# Patient Record
Sex: MALE | Race: WHITE | Employment: FULL TIME | URBAN - METROPOLITAN AREA
[De-identification: names, ages, dates, MRNs, and addresses within clinical notes are randomized per-mention and may not be internally consistent; named-entity substitution may affect disease eponyms.]

---

## 2017-10-31 ENCOUNTER — OFFICE VISIT (OUTPATIENT)
Dept: ORTHOPEDIC SURGERY | Age: 22
End: 2017-10-31

## 2017-10-31 VITALS
RESPIRATION RATE: 16 BRPM | BODY MASS INDEX: 39.2 KG/M2 | HEIGHT: 71 IN | HEART RATE: 89 BPM | SYSTOLIC BLOOD PRESSURE: 136 MMHG | WEIGHT: 280 LBS | DIASTOLIC BLOOD PRESSURE: 74 MMHG

## 2017-10-31 DIAGNOSIS — S92.355A CLOSED NONDISPLACED FRACTURE OF FIFTH METATARSAL BONE OF LEFT FOOT, INITIAL ENCOUNTER: Primary | ICD-10-CM

## 2017-10-31 PROCEDURE — 28470 CLTX METATARSAL FX WO MNP EA: CPT | Performed by: ORTHOPAEDIC SURGERY

## 2017-10-31 PROCEDURE — 99203 OFFICE O/P NEW LOW 30 MIN: CPT | Performed by: ORTHOPAEDIC SURGERY

## 2017-10-31 PROCEDURE — L4361 PNEUMA/VAC WALK BOOT PRE OTS: HCPCS | Performed by: ORTHOPAEDIC SURGERY

## 2017-10-31 PROCEDURE — 73630 X-RAY EXAM OF FOOT: CPT | Performed by: ORTHOPAEDIC SURGERY

## 2017-11-05 PROBLEM — S92.355A CLOSED NONDISPLACED FRACTURE OF FIFTH LEFT METATARSAL BONE: Status: ACTIVE | Noted: 2017-11-05

## 2017-11-05 NOTE — PROGRESS NOTES
CHIEF COMPLAINT: Left foot pain/ 5th MT proximal shaft minimally displaced fracture. DATE OF INJURY:  10/28/2017, DOT 10/31/2017    HISTORY:  Mr. Angel Ventura 23 y.o.   male  presents today for the first visit for evaluation of a left foot injury which occurred when he was playing football. He is complaining of lateral foot pain and swelling. Rates pain a 6/10 VAS. This is better with elevation and worse with bearing any wt. The pain is sharp and not radiating. No other complaint. He has h/o left foot fracture 5 years ago. History reviewed. No pertinent past medical history. History reviewed. No pertinent surgical history. Social History     Social History    Marital status: Single     Spouse name: N/A    Number of children: N/A    Years of education: N/A     Occupational History    Not on file. Social History Main Topics    Smoking status: Never Smoker    Smokeless tobacco: Never Used    Alcohol use No    Drug use: No    Sexual activity: Not on file     Other Topics Concern    Not on file     Social History Narrative    No narrative on file       History reviewed. No pertinent family history. No current outpatient prescriptions on file prior to visit. No current facility-administered medications on file prior to visit. Pertinent items are noted in HPI  Review of systems reviewed from Patient History Form dated on 10/31/2017 and available in the patient's chart under the Media tab. PHYSICAL EXAMINATION:  Mr. Angel Ventura is a very pleasant 23 y.o.  male who presents today in no acute distress, awake, alert, and oriented. He is well dressed, nourished and  groomed. Patient with normal affect. Height is  5' 11\" (1.803 m), weight is 280 lb (127 kg), Body mass index is 39.05 kg/m². Resting respiratory rate is 16. Examination of the gait, showed that the patient walks with a limp.   Examination of both ankles showing a decreased range of motion of the

## 2017-12-06 ENCOUNTER — OFFICE VISIT (OUTPATIENT)
Dept: ORTHOPEDIC SURGERY | Age: 22
End: 2017-12-06

## 2017-12-06 VITALS — BODY MASS INDEX: 36.96 KG/M2 | RESPIRATION RATE: 18 BRPM | HEIGHT: 71 IN | WEIGHT: 264 LBS

## 2017-12-06 DIAGNOSIS — S92.355A CLOSED NONDISPLACED FRACTURE OF FIFTH METATARSAL BONE OF LEFT FOOT, INITIAL ENCOUNTER: Primary | ICD-10-CM

## 2017-12-06 PROCEDURE — 99024 POSTOP FOLLOW-UP VISIT: CPT | Performed by: ORTHOPAEDIC SURGERY

## 2017-12-06 NOTE — PROGRESS NOTES
ankle compare to the other side. There is minimal swelling that can be seen, no ecchymosis over lateral side of the left foot. He  has intact sensation and good pedal pulses. He has no tenderness on deep palpation over the 5th MT base left foot        IMAGING: Xray's were reviewed, taken today in the office, 3 views of the left foot, and showed a minimally displaced 5th MT proximal base fracture, possible stress fracture. IMPRESSION: Left 5th MT proximal shaft minimally displaced fracture. PLAN: I discussed that the overall alignment of this fracture is good and healing well. We discussed the risk of nonunion and  malunion. He will be WBAT in the boot, and start aggressive ROM and peroneal strengthening exercise. Off the boot in 1 week. The patient will come back for a follow up in 2 months. At that time, we will take 3 views of the left foot standing.        Elsi Fletcher MD

## 2018-02-15 ENCOUNTER — TELEPHONE (OUTPATIENT)
Dept: ORTHOPEDIC SURGERY | Age: 23
End: 2018-02-15

## 2018-02-15 ENCOUNTER — OFFICE VISIT (OUTPATIENT)
Dept: ORTHOPEDIC SURGERY | Age: 23
End: 2018-02-15

## 2018-02-15 VITALS
DIASTOLIC BLOOD PRESSURE: 86 MMHG | HEART RATE: 78 BPM | SYSTOLIC BLOOD PRESSURE: 130 MMHG | WEIGHT: 283 LBS | HEIGHT: 71 IN | BODY MASS INDEX: 39.62 KG/M2

## 2018-02-15 DIAGNOSIS — M25.511 ACUTE PAIN OF RIGHT SHOULDER: Primary | ICD-10-CM

## 2018-02-15 PROCEDURE — 99214 OFFICE O/P EST MOD 30 MIN: CPT | Performed by: ORTHOPAEDIC SURGERY

## 2018-02-15 NOTE — PROGRESS NOTES
General:   alert, appears stated age, cooperative and no distress   Right Shoulder   Active ROM:   forward flexion 180, external rotation 80, internal rotation L4. Bilateral shoulders   Joint Tenderness:   posterior acromial   Neer:   positive   Serrano:   negative   Strength:   5/5 Supraspinatus, External rotation, Internal rotation    Bilateral shoulders   Drop-arm test:   negative   Belly-press test:   negative   Bear-hug test:   negative   Speed's test:   negative   Bicipital groove tenderness:  negative   Telles's test:   positive   Cross-body adduction test:   negative    AC joint tenderness:   negative   Negative jerk test.    There are no skin lesions, cellulitis, or extreme edema in the upper extremities. Sensation is grossly intact to light touch bilaterally upper extremity. The patient has warm and well-perfused Bilateral upper extremities with brisk capillary refill. Imaging   Right Shoulder X-Ray: 3 view x-rays of the shoulder including AP, scapular Y, and axillary obtained and reviewed  AC Joint: no abnormalities noted  Glenohumeral joint: no abnormalities noted  Elevation humeral head: absent     Right Shoulder MRI: ordered, but not yet obtained      Assessment:      Right shoulder pain, possible labral tear      Plan:      Natural history and expected course discussed. Questions answered. MR arthrogram of right shoulder given 6 month history of pain after injury during football, and he has already been doing RTC and periscapular strengthening in training room. ATC informed of plan. Patient is agreeable with me informing ATC of MRI results to expedite treatment. Will call or discuss plan with ATC after MRI.

## 2018-02-16 ENCOUNTER — TELEPHONE (OUTPATIENT)
Dept: ORTHOPEDIC SURGERY | Age: 23
End: 2018-02-16

## 2018-02-16 ENCOUNTER — HOSPITAL ENCOUNTER (OUTPATIENT)
Dept: GENERAL RADIOLOGY | Age: 23
Discharge: HOME | End: 2018-02-16
Attending: ORTHOPAEDIC SURGERY | Admitting: ORTHOPAEDIC SURGERY

## 2018-02-16 DIAGNOSIS — M25.511 ACUTE PAIN OF RIGHT SHOULDER: ICD-10-CM

## 2018-02-16 DIAGNOSIS — G89.11 ACUTE PAIN OF RIGHT SHOULDER DUE TO TRAUMA: ICD-10-CM

## 2018-02-16 DIAGNOSIS — M25.511 RIGHT SHOULDER PAIN, UNSPECIFIED CHRONICITY: Primary | ICD-10-CM

## 2018-02-16 DIAGNOSIS — M25.511 PAIN IN RIGHT SHOULDER: ICD-10-CM

## 2018-02-16 DIAGNOSIS — M25.511 ACUTE PAIN OF RIGHT SHOULDER DUE TO TRAUMA: ICD-10-CM

## 2018-02-20 ENCOUNTER — OFFICE VISIT (OUTPATIENT)
Dept: ORTHOPEDIC SURGERY | Age: 23
End: 2018-02-20

## 2018-02-20 VITALS
WEIGHT: 283.07 LBS | BODY MASS INDEX: 39.63 KG/M2 | SYSTOLIC BLOOD PRESSURE: 119 MMHG | HEART RATE: 77 BPM | HEIGHT: 71 IN | DIASTOLIC BLOOD PRESSURE: 82 MMHG | RESPIRATION RATE: 17 BRPM

## 2018-02-20 DIAGNOSIS — S43.431A TEAR OF RIGHT GLENOID LABRUM, INITIAL ENCOUNTER: Primary | ICD-10-CM

## 2018-02-20 PROCEDURE — 99214 OFFICE O/P EST MOD 30 MIN: CPT | Performed by: ORTHOPAEDIC SURGERY

## 2018-02-20 NOTE — PROGRESS NOTES
CHIEF COMPLAINT: Right shoulder pain    DATE OF INJURY: August 2017    History:    Hilary Lin is a 23 y.o. 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 Initial history:  referred by Reji Langford ATC for evaluation and treatment of Right shoulder pain. This is evaluated as a personal injury. The pain is described as aching. The pain began 6 months ago. There was an injury. He felt a pop in th shoulder while blocking another player. Pain is rated as a 4-7/10 . Pain is located deep in shoulder, posterior. The symptoms are worse with bench press, lifting, laying on side. Symptoms improve with ice, Game[employer], NSAIDs. Limited activities include no limitations. No stiffness, no weakness reported. The patient does not report night pain. The patient has not had PT. The patient has not had an injection. The patient has tried NSAIDs. He is an offensive . Interval History:  Shoulder feels better. Rates pain 0/10. Physical Examination:      Vital signs:  /82   Pulse 77   Resp 17   Ht 5' 10.87\" (1.8 m)   Wt 283 lb 1.1 oz (128.4 kg)   BMI 39.63 kg/m²    General:   alert, appears stated age, cooperative and no distress   Right Shoulder   Active ROM:   forward flexion 180, external rotation 80, internal rotation L4. Bilateral shoulders   Joint Tenderness:   posterior acromial   Neer:   positive   Serrano:   negative   Strength:   5/5 Supraspinatus, External rotation, Internal rotation    Bilateral shoulders   Drop-arm test:   negative   Belly-press test:   negative   Bear-hug test:   negative   Speed's test:   negative   Bicipital groove tenderness:  negative   Telles's test:   positive   Cross-body adduction test:   negative    AC joint tenderness:   negative   Negative jerk test.    There are no skin lesions, cellulitis, or extreme edema in the upper extremities.  Sensation is grossly intact to light touch bilaterally upper extremity. The patient has warm and well-perfused Bilateral upper extremities with brisk capillary refill. Imaging   Right Shoulder MRI: I independently reviewed the images, as well as the radiology report. Nondisplaced tear of the posterosuperior, posterior, and posterior inferior   labrum, most compatible with a SLAP 8 type tear.  No discrete rotator cuff   tear.       Os acromiale variant.           Assessment:      Right shoulder posterior labral tear      Plan:      I had an extensive discussion with Mr. Toyin Lopez and/or family regarding the natural history, etiology, and long term consequences of his condition. I have outlined a treatment plan with them and, in my opinion, surgical intervention is indicated at this time. I have discussed the potential complications (including, but not limited to injury to nerve or blood vessel, infection, bleeding, DVT or PE, stiffness, incomplete pain relief, need for further surgery, and anesthetic complications), limitations, expectations, alternatives, and risks of the surgical procedure. We also discussed the importance of postoperative rehabilitation. He has had full opportunity to ask his questions. I have answered them all to his satisfaction. I feel that Mr. Toyin Lopez understands our discussion today and he will provide written informed consent for the procedure.       Plan for right shoulder arthroscopy, posterior labral repair    I will perform H&P.

## 2018-02-22 ENCOUNTER — PAT TELEPHONE (OUTPATIENT)
Dept: PREADMISSION TESTING | Age: 23
End: 2018-02-22

## 2018-02-22 VITALS — WEIGHT: 274 LBS | BODY MASS INDEX: 38.36 KG/M2 | HEIGHT: 71 IN

## 2018-02-22 NOTE — PRE-PROCEDURE INSTRUCTIONS
[address] [facility] time____0715________        Surgery time__0910__________    Take the following medications with a sip of water:  No medications    Do not eat or drink anything after 12:00 midnight prior to your surgery. This includes water chewing gum, mints and ice chips. You may brush your teeth and gargle the morning of your surgery, but do not swallow the water     Please see your family doctor/pediatrician for a history and physical and/or concerning medications. Bring any test results/reports from your physicians office. If you are under the care of a heart doctor or specialist doctor, please be aware that you may be asked to them for clearance    You may be asked to stop blood thinners such as Coumadin, Plavix, Fragmin, Lovenox, etc., or any anti-inflammatories such as:  Aspirin, Ibuprofen, Advil, Naproxen prior to your surgery. We also ask that you stop any OTC medications such as fish oil, vitamin E, glucosamine, garlic, Multivitamins, COQ 10, etc.    We ask that you do not smoke 24 hours prior to surgery  We ask that you do not  drink any alcoholic beverages 24 hours prior to surgery     You must make arrangements for a responsible adult to take you home after your surgery. For your safety you will not be allowed to leave alone or drive yourself home. Your surgery will be cancelled if you do not have a ride home. Also for your safety, it is strongly suggested that someone stay with you the first 24 hours after your surgery. A parent or legal guardian must accompany a child scheduled for surgery and plan to stay at the hospital until the child is discharged. Please do not bring other children with you. For your comfort, please wear simple loose fitting clothing to the hospital.  Please do not bring valuables.     Do not wear any make-up or nail polish on your fingers or toes      For your safety, please do not wear any jewelry or body

## 2018-03-02 ENCOUNTER — HOSPITAL ENCOUNTER (OUTPATIENT)
Dept: SURGERY | Age: 23
Discharge: HOME | End: 2018-03-02
Attending: ORTHOPAEDIC SURGERY | Admitting: ORTHOPAEDIC SURGERY

## 2018-03-02 VITALS
DIASTOLIC BLOOD PRESSURE: 92 MMHG | OXYGEN SATURATION: 97 % | TEMPERATURE: 97.3 F | HEART RATE: 72 BPM | SYSTOLIC BLOOD PRESSURE: 140 MMHG | RESPIRATION RATE: 16 BRPM

## 2018-03-02 DIAGNOSIS — S43.431A TEAR OF RIGHT GLENOID LABRUM, INITIAL ENCOUNTER: Primary | ICD-10-CM

## 2018-03-02 PROCEDURE — 29807 SHO ARTHRS SRG RPR SLAP LES: CPT | Performed by: ORTHOPAEDIC SURGERY

## 2018-03-02 RX ORDER — PROMETHAZINE HYDROCHLORIDE 25 MG/1
25 TABLET ORAL EVERY 6 HOURS PRN
Qty: 5 TABLET | Refills: 0 | Status: SHIPPED | OUTPATIENT
Start: 2018-03-02 | End: 2018-03-20

## 2018-03-02 RX ORDER — SODIUM CHLORIDE 9 MG/ML
INJECTION, SOLUTION INTRAVENOUS CONTINUOUS
Status: DISCONTINUED | OUTPATIENT
Start: 2018-03-02 | End: 2018-03-03 | Stop reason: HOSPADM

## 2018-03-02 RX ORDER — SODIUM CHLORIDE 0.9 % (FLUSH) 0.9 %
10 SYRINGE (ML) INJECTION EVERY 12 HOURS SCHEDULED
Status: DISCONTINUED | OUTPATIENT
Start: 2018-03-02 | End: 2018-03-03 | Stop reason: HOSPADM

## 2018-03-02 RX ORDER — PROMETHAZINE HYDROCHLORIDE 25 MG/ML
6.25 INJECTION, SOLUTION INTRAMUSCULAR; INTRAVENOUS
Status: ACTIVE | OUTPATIENT
Start: 2018-03-02 | End: 2018-03-02

## 2018-03-02 RX ORDER — ONDANSETRON 2 MG/ML
4 INJECTION INTRAMUSCULAR; INTRAVENOUS
Status: ACTIVE | OUTPATIENT
Start: 2018-03-02 | End: 2018-03-02

## 2018-03-02 RX ORDER — FENTANYL CITRATE 50 UG/ML
25 INJECTION, SOLUTION INTRAMUSCULAR; INTRAVENOUS EVERY 5 MIN PRN
Status: DISCONTINUED | OUTPATIENT
Start: 2018-03-02 | End: 2018-03-03 | Stop reason: HOSPADM

## 2018-03-02 RX ORDER — OXYCODONE HYDROCHLORIDE AND ACETAMINOPHEN 5; 325 MG/1; MG/1
1-2 TABLET ORAL EVERY 4 HOURS PRN
Qty: 60 TABLET | Refills: 0 | Status: SHIPPED | OUTPATIENT
Start: 2018-03-02 | End: 2018-03-16

## 2018-03-02 RX ORDER — SODIUM CHLORIDE 0.9 % (FLUSH) 0.9 %
10 SYRINGE (ML) INJECTION PRN
Status: DISCONTINUED | OUTPATIENT
Start: 2018-03-02 | End: 2018-03-03 | Stop reason: HOSPADM

## 2018-03-02 RX ADMIN — SODIUM CHLORIDE: 9 INJECTION, SOLUTION INTRAVENOUS at 07:24

## 2018-03-02 ASSESSMENT — PAIN DESCRIPTION - PAIN TYPE
TYPE: ACUTE PAIN
TYPE: SURGICAL PAIN

## 2018-03-02 ASSESSMENT — PAIN SCALES - GENERAL
PAINLEVEL_OUTOF10: 0
PAINLEVEL_OUTOF10: 4

## 2018-03-02 ASSESSMENT — PAIN - FUNCTIONAL ASSESSMENT: PAIN_FUNCTIONAL_ASSESSMENT: 0-10

## 2018-03-02 ASSESSMENT — PAIN DESCRIPTION - LOCATION: LOCATION: HEAD

## 2018-03-02 ASSESSMENT — PAIN DESCRIPTION - DESCRIPTORS: DESCRIPTORS: HEADACHE

## 2018-03-02 NOTE — ANESTHESIA PRE-OP
[facility] Department of Anesthesiology  Pre-Anesthesia Evaluation/Consultation       Name:  Sonja Stuart  : 1995  Age:  23 y.o. MRN:  [id number]  Date: 3/2/2018           Procedure (Scheduled):  R shoulder arthroscopy  Surgeon:  Dr. Silveira Clock     No Known Allergies  Patient Active Problem List   Diagnosis    Closed nondisplaced fracture of fifth left metatarsal bone    Tear of right glenoid labrum     Past Medical History:   Diagnosis Date    Wears glasses     reading     Past Surgical History:   Procedure Laterality Date    WISDOM TOOTH EXTRACTION       Social History   Substance Use Topics    Smoking status: Never Smoker    Smokeless tobacco: Never Used    Alcohol use No     Medications  Current Outpatient Prescriptions on File Prior to Encounter   Medication Sig Dispense Refill    Beta Carotene ([employer] B-CAROTENE PO) Take by mouth       No current facility-administered medications on file prior to encounter.       Current Outpatient Prescriptions   Medication Sig Dispense Refill    Beta Carotene ([employer] B-CAROTENE PO) Take by mouth       Current Facility-Administered Medications   Medication Dose Route Frequency Provider Last Rate Last Dose    0.9 % sodium chloride infusion   Intravenous Continuous Regina Obrien  mL/hr at 18 07      sodium chloride flush 0.9 % injection 10 mL  10 mL Intravenous 2 times per day Regina Obrien MD        sodium chloride flush 0.9 % injection 10 mL  10 mL Intravenous PRN Regina Obrien MD        ceFAZolin (ANCEF) 3 g in dextrose 5 % 100 mL IVPB  3 g Intravenous Once Flaca Lemus MD         Vital Signs (Current)   Vitals:    18   BP: (!) 148/91   Pulse: 77   Resp: 18   Temp: 98.2 °F (36.8 °C)   SpO2: 98%     Vital Signs Statistics (for past 48 hrs)     Temp  Av.2 °F (36.8 °C)  Min: 98.2 °F (36.8 °C)   Min taken time: 18  Max: 98.2 °F (36.8 °C)   Max taken time: 18  Pulse Av  Min: 68   Min taken time: 18  Max: 68   Max taken time: 18  Resp  Av  Min: 25   Min taken time: 18  Max: 25   Max taken time: 18  BP  Min: 148/91   Min taken time: 18  Max: 148/91   Max taken time: 18  SpO2  Av %  Min: 98 %   Min taken time: 18  Max: 98 %   Max taken time: 18    BP Readings from Last 3 Encounters:   18 (!) 148/91   18 119/82   02/15/18 130/86     BMI  There is no height or weight on file to calculate BMI. Estimated body mass index is 38.22 kg/m² as calculated from the following:    Height as of 18: 5' 11\" (1.803 m). Weight as of 18: 274 lb (124.3 kg). CBC No results found for: WBC, RBC, HGB, HCT, MCV, RDW, PLT  CMP  No results found for: NA, K, CL, CO2, BUN, CREATININE, GFRAA, AGRATIO, LABGLOM, GLUCOSE, PROT, CALCIUM, BILITOT, ALKPHOS, AST, ALT  BMP  No results found for: NA, K, CL, CO2, BUN, CREATININE, CALCIUM, GFRAA, LABGLOM, GLUCOSE  POCGlucose  No results for input(s): GLUCOSE in the last 72 hours.    Coags  No results found for: PROTIME, INR, APTT  HCG (If Applicable) No results found for: PREGTESTUR, PREGSERUM, HCG, HCGQUANT   ABGs No results found for: PHART, PO2ART, BQM5OBM, DAW1UNL, BEART, X3XBDROZ   Type & Screen (If Applicable)  No results found for: LABABO, LABRH                         BMI: Wt Readings from Last 3 Encounters:       NPO Status:   Date of last liquid consumption: 18   Time of last liquid consumption:    Date of last solid food consumption: 18              Anesthesia Evaluation  Patient summary reviewed no history of anesthetic complications:   Airway: Mallampati: I  TM distance: >3 FB   Neck ROM: full  Mouth opening: > = 3 FB Dental: normal exam         Pulmonary:Negative Pulmonary ROS and normal exam  breath sounds clear to auscultation                             Cardiovascular:Negative CV ROS  Exercise tolerance: good

## 2018-03-03 NOTE — OP NOTE
He received  prophylactic preoperative IV antibiotics. He had DVT prophylaxis with  sequential compression devices on his bilateral lower extremities. He was  then placed in the lateral decubitus position with his right side up. An  axillary roll was placed. Care was taken to ensure that all bony  prominences were well padded. His right arm was then placed in an  arthroscopic arm martell. His right shoulder was sterilely prepped and  draped in the usual fashion. A time-out was taken where the patient, the  operative extremity, and the operative procedure were once again verified. We then created our standard posterior arthroscopic portal.  The  arthroscope was inserted into glenohumeral joint. An anterior portal was  then created. There were no abnormalities in the anterior shoulder. The  subscapularis was intact. His labrum anteriorly and superiorly were all  completely intact. His biceps tendon was without any evidence of  tendinopathy or tendinitis. His articular cartilage was intact without any  evidence of chondromalacia within his glenoid or his humeral head. Viewing from the posterior portal, we could visualize some fraying of the  posterior labrum. We created an anterior-inferior portal as our passing  portal.  We then switched our scope to the anterior portal.  We then  created a larger posterior portal with the cannula. Viewing from the  anterior shoulder, we could visualize a displaced posterior labral tear,  starting at about the 11 o'clock position and extending down to about the 7  or 8 o'clock position posteriorly. With probing, this could be elevated  off the lip of the glenoid. We thus began by preparing the glenoid using a shaver as well as a rasp to  create a healing bone bed for our labral repair. We then passed the  Arthrex FiberLink suture through the labrum and then fed this through  itself to create a cinch stitch. This was then fed through a PushLock  anchor.   This

## 2018-03-05 ENCOUNTER — OFFICE VISIT (OUTPATIENT)
Dept: ORTHOPEDIC SURGERY | Age: 23
End: 2018-03-05

## 2018-03-05 VITALS — WEIGHT: 274.03 LBS | HEIGHT: 71 IN | BODY MASS INDEX: 38.36 KG/M2 | RESPIRATION RATE: 17 BRPM

## 2018-03-05 DIAGNOSIS — S43.431D TEAR OF RIGHT GLENOID LABRUM, SUBSEQUENT ENCOUNTER: Primary | ICD-10-CM

## 2018-03-05 PROCEDURE — 99024 POSTOP FOLLOW-UP VISIT: CPT | Performed by: ORTHOPAEDIC SURGERY

## 2018-03-14 ENCOUNTER — HOSPITAL ENCOUNTER (OUTPATIENT)
Dept: OTHER | Age: 23
Discharge: HOME | End: 2018-03-31
Attending: ORTHOPAEDIC SURGERY | Admitting: ORTHOPAEDIC SURGERY

## 2018-03-20 ENCOUNTER — OFFICE VISIT (OUTPATIENT)
Dept: ORTHOPEDIC SURGERY | Age: 23
End: 2018-03-20

## 2018-03-20 VITALS — HEIGHT: 71 IN | BODY MASS INDEX: 38.36 KG/M2 | RESPIRATION RATE: 16 BRPM | WEIGHT: 274 LBS

## 2018-03-20 DIAGNOSIS — S43.431D TEAR OF RIGHT GLENOID LABRUM, SUBSEQUENT ENCOUNTER: Primary | ICD-10-CM

## 2018-03-20 PROCEDURE — 99024 POSTOP FOLLOW-UP VISIT: CPT | Performed by: ORTHOPAEDIC SURGERY

## 2018-03-20 RX ORDER — ACETAMINOPHEN 325 MG/1
650 TABLET ORAL EVERY 6 HOURS PRN
COMMUNITY
End: 2018-10-30

## 2018-03-21 ENCOUNTER — HOSPITAL ENCOUNTER (OUTPATIENT)
Dept: PHYSICAL THERAPY | Age: 23
Discharge: HOME | End: 2018-03-22
Admitting: ORTHOPAEDIC SURGERY

## 2018-03-21 NOTE — FLOWSHEET NOTE
Table Slides  Flexion 10x10    UE Malo      Pulleys  Flexion 10x10       AROM Elbow  10x10    Isometrics      Retraction        3' black ball                   Biceps      Triceps            PRE's      Flexion      Abduction      External Rotation      Internal Rotation      Shrugs      EXT      Reverse Flys      Serratus      Horizontal Abd with ER      Biceps      Triceps      Retraction            Cable Column/Theraband      External Rotation green 3x10    Internal Rotation green 3x10    Shrugs 0# AROM 3x10    Lats      Ext green 3x10    Flex      Scapular Retraction green 3x10    BIC      TRIC      PNF            Dynamic Stability            Plyoback                Therapeutic Exercise and NMR EXR  [x] (07999) Provided verbal/tactile cueing for activities related to strengthening, flexibility, endurance, ROM  for improvements in scapular, scapulothoracic and UE control with self care, reaching, carrying, lifting, house/yardwork, driving/computer work.    [] (20580) Provided verbal/tactile cueing for activities related to improving balance, coordination, kinesthetic sense, posture, motor skill, proprioception  to assist with  scapular, scapulothoracic and UE control with self care, reaching, carrying, lifting, house/yardwork, driving/computer work. Therapeutic Activities:    [x] (03843 or 11321) Provided verbal/tactile cueing for activities related to improving balance, coordination, kinesthetic sense, posture, motor skill, proprioception and motor activation to allow for proper function of scapular, scapulothoracic and UE control with self care, carrying, lifting, driving/computer work.      Home Exercise Program:    [x] (92883) Reviewed/Progressed HEP activities related to strengthening, flexibility, endurance, ROM of scapular, scapulothoracic and UE control with self care, reaching, carrying, lifting, house/yardwork, driving/computer work  [] (08666) Reviewed/Progressed HEP activities related to improving balance, coordination, kinesthetic sense, posture, motor skill, proprioception of scapular, scapulothoracic and UE control with self care, reaching, carrying, lifting, house/yardwork, driving/computer work      Manual Treatments:  PROM / STM / Oscillations-Mobs:  G-I, II, III, IV (PA's, Inf., Post.)  [] (29326) Provided manual therapy to mobilize soft tissue/joints of cervical/CT, scapular GHJ and UE for the purpose of modulating pain, promoting relaxation,  increasing ROM, reducing/eliminating soft tissue swelling/inflammation/restriction, improving soft tissue extensibility and allowing for proper ROM for normal function with self care, reaching, carrying, lifting, house/yardwork, driving/computer work    Modalities:     Charges:  Timed Code Treatment Minutes: 47   Total Treatment Minutes: 55     [] EVAL (LOW) 04409   [] EVAL (MOD) 29082   [] EVAL (HIGH) 65143   [] RE-EVAL   [x] QU(05316) x  2   [] IONTO  [] NMR (31869) x      [] VASO  [] Manual (07392) x       [] Other:  [x] TA x  1    [] Mech Traction (69014)  [] ES(attended) (69742)      [] ES (un) (85748):       GOALS:  Patient stated goal: get back to football in the fall, be able to play golf this summer, be able to start lifting this summer     Therapist goals for Patient:   Short Term Goals: To be achieved in: 2 weeks  1. Independent in HEP and progression per patient tolerance, in order to prevent re-injury. 2. Patient will have a decrease in pain to facilitate improvement in movement, function, and ADLs as indicated by Functional Deficits.     Long Term Goals: To be achieved in: 6-8 weeks  1. Disability index score of 20% or less for the UEFI to assist with reaching prior level of function. 2. Patient will demonstrate increased AROM to 170+ flexion, 70 ER, 40 IR to allow for proper joint functioning as indicated by patients Functional Deficits.    3. Patient will demonstrate an increase in scapular stability strength as demonstrated by no scapular winging during WB activities  to allow for proper functional mobility as indicated by patients Functional Deficits. 4. Patient will return to UE weightlifting with team  without increased symptoms or restriction. 12+ weeks    Progression Towards Functional goals:  [x] Patient is progressing as expected towards functional goals listed. [] Progression is slowed due to complexities listed. [] Progression has been slowed due to co-morbidities. [] Plan just implemented, too soon to assess goals progression  [] Other:     ASSESSMENT:      Treatment/Activity Tolerance:  [x] Patient tolerated treatment well [] Patient limited by fatique  [] Patient limited by pain  [] Patient limited by other medical complications  [x] Other:  Pt w/ good tolerance to progression of protocol. No discomfort during forward flexion. Pt complained of minor discomfort during ER, primarily at end range. Emphasized importance of following protocol and continuing use of sling 24/7 until week 5; patient verbalized understanding. Pt shows independence w/ HEP, will continue exercises while on spring break next week, and return to PT in two weeks. Prognosis: [x] Good [] Fair  [] Poor    Patient Requires Follow-up: [x] Yes  [] No    PLAN: Progress per MD protocol  [x] Continue per plan of care [] Alter current plan (see comments)  [] Plan of care initiated [] Hold pending MD visit [] Discharge    Electronically signed by:  ARSEN Craven     Therapist was present, directed the patient's care, made skilled judgement, and was responsible for assessment and treatment of the patient.     Himanshu Julien, PT, DPT

## 2018-04-01 ENCOUNTER — HOSPITAL ENCOUNTER (OUTPATIENT)
Dept: OTHER | Age: 23
Discharge: HOME | End: 2018-04-30
Attending: ORTHOPAEDIC SURGERY | Admitting: ORTHOPAEDIC SURGERY

## 2018-04-04 ENCOUNTER — HOSPITAL ENCOUNTER (OUTPATIENT)
Dept: PHYSICAL THERAPY | Age: 23
Discharge: HOME | End: 2018-04-05
Admitting: ORTHOPAEDIC SURGERY

## 2018-04-04 NOTE — FLOWSHEET NOTE
(35242) Reviewed/Progressed HEP activities related to strengthening, flexibility, endurance, ROM of scapular, scapulothoracic and UE control with self care, reaching, carrying, lifting, house/yardwork, driving/computer work  [] (74084) Reviewed/Progressed HEP activities related to improving balance, coordination, kinesthetic sense, posture, motor skill, proprioception of scapular, scapulothoracic and UE control with self care, reaching, carrying, lifting, house/yardwork, driving/computer work      Manual Treatments:  PROM / STM / Oscillations-Mobs:  G-I, II, III, IV (PA's, Inf., Post.)  [] (09057) Provided manual therapy to mobilize soft tissue/joints of cervical/CT, scapular GHJ and UE for the purpose of modulating pain, promoting relaxation,  increasing ROM, reducing/eliminating soft tissue swelling/inflammation/restriction, improving soft tissue extensibility and allowing for proper ROM for normal function with self care, reaching, carrying, lifting, house/yardwork, driving/computer work    Modalities: Pt to use game ready in training room immediately after session    Charges:  Timed Code Treatment Minutes: 75   Total Treatment Minutes: 80     [] EVAL (LOW) 73251   [] EVAL (MOD) 90548   [] EVAL (HIGH) 49688   [] RE-EVAL   [x] EE(94556) x  2   [] IONTO  [x] NMR (27552) x  1   [] VASO  [] Manual (48804) x       [] Other:  [x] TA x  2    [] Mech Traction (10383)  [] ES(attended) (50624)      [] ES (un) (24720):       GOALS:  Patient stated goal: get back to football in the fall, be able to play golf this summer, be able to start lifting this summer     Therapist goals for Patient:   Short Term Goals: To be achieved in: 2 weeks  1. Independent in HEP and progression per patient tolerance, in order to prevent re-injury. 2. Patient will have a decrease in pain to facilitate improvement in movement, function, and ADLs as indicated by Functional Deficits.     Long Term Goals: To be achieved in: 6-8 weeks  1.  Disability index score of 20% or less for the UEFI to assist with reaching prior level of function. 2. Patient will demonstrate increased AROM to 170+ flexion, 70 ER, 40 IR to allow for proper joint functioning as indicated by patients Functional Deficits. 3. Patient will demonstrate an increase in scapular stability strength as demonstrated by no scapular winging during WB activities  to allow for proper functional mobility as indicated by patients Functional Deficits. 4. Patient will return to UE weightlifting with team  without increased symptoms or restriction. 12+ weeks    Progression Towards Functional goals:  [x] Patient is progressing as expected towards functional goals listed. [] Progression is slowed due to complexities listed. [] Progression has been slowed due to co-morbidities. [] Plan just implemented, too soon to assess goals progression  [] Other:      ASSESSMENT:      Treatment/Activity Tolerance:  [x] Patient tolerated treatment well [] Patient limited by fatique  [] Patient limited by pain  [] Patient limited by other medical complications  [x] Other:  Pt w/ good tolerance to progression of protocol introducing stretches and increased PREs. Required frequent cues to limit intensity, especially as he returns to weight room on his own. Reminded him of MD protocol and he verbalized understanding. Prognosis: [x] Good [] Fair  [] Poor    Patient Requires Follow-up: [x] Yes  [] No    PLAN: Progress per MD protocol  [x] Continue per plan of care [] Alter current plan (see comments)  [] Plan of care initiated [] Hold pending MD visit [] Discharge    Electronically signed by:  Devonna Bernheim, SPT     Therapist was present, directed the patient's care, made skilled judgement, and was responsible for assessment and treatment of the patient.     Rosalva Miner, PT, DPT

## 2018-04-11 ENCOUNTER — HOSPITAL ENCOUNTER (OUTPATIENT)
Dept: PHYSICAL THERAPY | Age: 23
Discharge: HOME | End: 2018-04-12
Admitting: ORTHOPAEDIC SURGERY

## 2018-04-11 NOTE — FLOWSHEET NOTE
10x10    Pulleys  Flexion; scaption; abduction 10x10    CBA table [id number]   Sleeper IR  10x10    Wall slides   10x10     Pec doorway stretch  10x10                          Isometrics      Retraction        3' black ball                   Biceps      Triceps            PRE's      Flexion      Abduction      External Rotation SL 2# 3x10    Internal Rotation      Shrugs      EXT      Reverse Flys      Serratus 4# 3x10     Horizontal Abd  Red  3x10     Biceps 8# 3x10 ECL 5 sec     Triceps      Retraction            Cable Column/Theraband      dc'd painful anterior shoulder   Internal Rotation blue 3x10    Shrugs 0# AROM 3x10    Lats      Ext black 3x10    Flex      Scapular Retraction black 3x10    BIC         PNF            Dynamic Stability            Plyoback                Therapeutic Exercise and NMR EXR  [x] (28277) Provided verbal/tactile cueing for activities related to strengthening, flexibility, endurance, ROM  for improvements in scapular, scapulothoracic and UE control with self care, reaching, carrying, lifting, house/yardwork, driving/computer work.    [] (49882) Provided verbal/tactile cueing for activities related to improving balance, coordination, kinesthetic sense, posture, motor skill, proprioception  to assist with  scapular, scapulothoracic and UE control with self care, reaching, carrying, lifting, house/yardwork, driving/computer work. Therapeutic Activities:    [x] (09467 or 81641) Provided verbal/tactile cueing for activities related to improving balance, coordination, kinesthetic sense, posture, motor skill, proprioception and motor activation to allow for proper function of scapular, scapulothoracic and UE control with self care, carrying, lifting, driving/computer work.      Home Exercise Program:    [x] (85808) Reviewed/Progressed HEP activities related to strengthening, flexibility, endurance, ROM of scapular, scapulothoracic and UE control with self care, reaching, carrying,

## 2018-04-18 ENCOUNTER — HOSPITAL ENCOUNTER (OUTPATIENT)
Dept: PHYSICAL THERAPY | Age: 23
Discharge: HOME | End: 2018-04-19
Admitting: ORTHOPAEDIC SURGERY

## 2018-04-18 NOTE — FLOWSHEET NOTE
[facility], [place]      Physical Therapy Daily Treatment Note  Date:  2018    Patient Name:  Ruthann Gandara    :  1995  MRN: [id number]  Medical/Treatment Diagnosis Information:  Diagnosis: C80.310I (ICD-10-CM) - Tear of right glenoid labrum, subsequent encounter  Treatment Diagnosis: Right shoulder pain N23.179  Insurance/Certification information:  PT Insurance Information: [employer]  Physician Information:  Referring Practitioner: Wilver Vazquez signed (Y/N): Y    Date of Patient follow up with Physician:      G-Code (if applicable):      Date G-Code Applied:    PT G-Codes  Functional Assessment Tool Used: UEFI  Score: 60%  Functional Limitation: Carrying, moving and handling objects  Carrying, Moving and Handling Objects Current Status (): At least 60 percent but less than 80 percent impaired, limited or restricted  Carrying, Moving and Handling Objects Goal Status (): At least 1 percent but less than 20 percent impaired, limited or restricted    Progress Note: [x]  Yes  []  No  Next due by: Visit #10      Latex Allergy:  [x]NO      []YES  Preferred Language for Healthcare:   [x]English       []other:    Visit # Insurance Allowable   5 MN     Pain level:  0/10    DOS: 2018     SUBJECTIVE:  Pt is 6 weeks s/p R shoulder arthroscopy. Pt notes he has no pain, he feels like he can reach overhead without the pain in front of shoulder this week. OBJECTIVE:  Observation:   Test measurements:      ROM PROM AROM  Comment    L R L R    Flexion        Abduction        ER        IR        Other        Other             Strength L R Comment   Flexion      Abduction      ER      IR      Supraspinatus      Upper Trap      Lower Trap      Mid Trap      Rhomboids      Biceps      Triceps      Horizontal Abduction      Horizontal Adduction      Lats              RESTRICTIONS/PRECAUTIONS: See MD protocol in media,     Weeks 6-12:  Full ROM, increased PRE    Weeks 12-24: Full strengthening, sport-specific, cleared for light/non-contact sports      Exercises:  Exercise/Equipment Resistance Repetitions Other comments   Stretching/ROM      Wand    ER 10x10 supine       BB IR rope  10x10    Pulleys  Flexion; scaption; abduction 10x10    CBA table [id number]   Sleeper IR  10x10    Wall slides   10x10     Pec doorway stretch  10x10                          Isometrics      Retraction                        Biceps      Triceps            PRE's      Flexion      Abduction      External Rotation SL 2# 3x10    Internal Rotation      Shrugs      EXT      Reverse Flys      Serratus 5# 3x10     Horizontal Abd  Red  3x10     Biceps 8# 3x10 ECL 5 sec     Triceps      Retraction      Prone Ys and Ts  3x10     Cable Column/Theraband      External Rotation black 3x10    Internal Rotation Black  3x10    Shrugs      Lats      Ext black 3x10    Flex      Scapular Retraction black 3x10    BIC         PNF blue 3x10 D2          Dynamic Stability            Plyoback                Therapeutic Exercise and NMR EXR  [x] (75018) Provided verbal/tactile cueing for activities related to strengthening, flexibility, endurance, ROM  for improvements in scapular, scapulothoracic and UE control with self care, reaching, carrying, lifting, house/yardwork, driving/computer work.    [] (58085) Provided verbal/tactile cueing for activities related to improving balance, coordination, kinesthetic sense, posture, motor skill, proprioception  to assist with  scapular, scapulothoracic and UE control with self care, reaching, carrying, lifting, house/yardwork, driving/computer work.     Therapeutic Activities:    [x] (67407 or 84053) Provided verbal/tactile cueing for activities related to improving balance, coordination, kinesthetic sense, posture, motor skill, proprioception and motor activation to allow for proper function of scapular, scapulothoracic and UE control with self care, carrying, lifting, driving/computer work.     Home Exercise Program:    [x] (96886) Reviewed/Progressed HEP activities related to strengthening, flexibility, endurance, ROM of scapular, scapulothoracic and UE control with self care, reaching, carrying, lifting, house/yardwork, driving/computer work  [] (06372) Reviewed/Progressed HEP activities related to improving balance, coordination, kinesthetic sense, posture, motor skill, proprioception of scapular, scapulothoracic and UE control with self care, reaching, carrying, lifting, house/yardwork, driving/computer work      Manual Treatments:  PROM / STM / Oscillations-Mobs:  G-I, II, III, IV (PA's, Inf., Post.)  [] ([id number]) Provided manual therapy to mobilize soft tissue/joints of cervical/CT, scapular GHJ and UE for the purpose of modulating pain, promoting relaxation,  increasing ROM, reducing/eliminating soft tissue swelling/inflammation/restriction, improving soft tissue extensibility and allowing for proper ROM for normal function with self care, reaching, carrying, lifting, house/yardwork, driving/computer work    Modalities:     Charges:  Timed Code Treatment Minutes: 58   Total Treatment Minutes: 65     [] EVAL (LOW) 17622   [] EVAL (MOD) 83570   [] EVAL (HIGH) 07298   [] RE-EVAL   [x] XY(54148) x  1   [] IONTO  [x] NMR (31762) x  1   [] VASO  [] Manual ([id number]) x       [] Other:  [x] TA x  2    [] [facility] Traction (11355)  [] ES(attended) (50371)      [] ES (un) (20089):       GOALS:  Patient stated goal: get back to football in the fall, be able to play golf this summer, be able to start lifting this summer     Therapist goals for Patient:   Short Term Goals: To be achieved in: 2 weeks  1. Independent in HEP and progression per patient tolerance, in order to prevent re-injury. 2. Patient will have a decrease in pain to facilitate improvement in movement, function, and ADLs as indicated by Functional Deficits.     Long Term Goals: To be achieved in: 6-8 weeks  1.  Disability index score of 20% or

## 2018-04-25 ENCOUNTER — HOSPITAL ENCOUNTER (OUTPATIENT)
Dept: PHYSICAL THERAPY | Age: 23
Discharge: HOME | End: 2018-04-26
Admitting: ORTHOPAEDIC SURGERY

## 2018-04-25 NOTE — PLAN OF CARE
[facility], [place]       Physical Therapy Discharge Summary- Annie Santana    Dear  Dr. Katheryn Rivera,    We had the pleasure of treating the following patient for physical therapy services at [address] [place] [address]. A summary of our findings can be found in the discharge summary below. If you have any questions or concerns regarding these findings, please do not hesitate to contact me at the office phone number above. Thank you for the referral.     Physician Signature:________________________________Date:__________________  By signing above (or electronic signature), therapists plan is approved by physician      Overall Response to Treatment:   []Patient is responding well to treatment and improvement is noted with regards  to goals   []Patient should continue to improve in reasonable time if they continue HEP   []Patient has plateaued and is no longer responding to skilled PT intervention    []Patient is getting worse and would benefit from return to referring MD   []Patient unable to adhere to initial POC   []Other: Pt shows excellent ROM but still has some strength deficits in all planes. He is finishing the school year and is returning home next week. He will continue with strengthening with  at home. He was given copy of current exercises and protocol to give to . At this time, pt is transferring care and will need MD clearance once he returns to school for return to sport. DC PT.      Date range of Visits: 3/14/18-18  Total Visits: 6   Physical Therapy Daily Treatment Note  Date:  2018    Patient Name:  Efrain Puga    :  1995  MRN: [id number]  Medical/Treatment Diagnosis Information:  Diagnosis: E49.126E (ICD-10-CM) - Tear of right glenoid labrum, subsequent encounter  Treatment Diagnosis: Right shoulder pain P24.880  Insurance/Certification information:  PT Insurance Information: [employer]et[employer]  Physician Information:  Referring Isometrics      Retraction                        Biceps      Triceps            PRE's      Flexion      Abduction      External Rotation SL 2# 3x10    Internal Rotation      Shrugs      EXT      Reverse Flys      Serratus 5# 3x10     Horizontal Abd  Black  3x10     Biceps 8# 3x10 ECL 5 sec     Triceps      Retraction      Prone Ys and Ts 2# 3x10     Cable Column/Theraband      External Rotation black 3x10    Internal Rotation Black  3x10    Shrugs      Lats      Ext black 3x10    Flex      Scapular Retraction black 3x10    BIC         PNF blue 3x10 D2          Dynamic Stability            Plyoback                Therapeutic Exercise and NMR EXR  [x] (40588) Provided verbal/tactile cueing for activities related to strengthening, flexibility, endurance, ROM  for improvements in scapular, scapulothoracic and UE control with self care, reaching, carrying, lifting, house/yardwork, driving/computer work.    [] (61383) Provided verbal/tactile cueing for activities related to improving balance, coordination, kinesthetic sense, posture, motor skill, proprioception  to assist with  scapular, scapulothoracic and UE control with self care, reaching, carrying, lifting, house/yardwork, driving/computer work. Therapeutic Activities:    [x] (64940 or 75083) Provided verbal/tactile cueing for activities related to improving balance, coordination, kinesthetic sense, posture, motor skill, proprioception and motor activation to allow for proper function of scapular, scapulothoracic and UE control with self care, carrying, lifting, driving/computer work.      Home Exercise Program:    [x] (60003) Reviewed/Progressed HEP activities related to strengthening, flexibility, endurance, ROM of scapular, scapulothoracic and UE control with self care, reaching, carrying, lifting, house/yardwork, driving/computer work  [] (53206) Reviewed/Progressed HEP activities related to improving balance, coordination, kinesthetic sense, for proper functional mobility as indicated by patients Functional Deficits. MET  4. Patient will return to UE weightlifting with team  without increased symptoms or restriction. 12+ weeks ONGOING    Progression Towards Functional goals:  [x] Patient is progressing as expected towards functional goals listed. [] Progression is slowed due to complexities listed. [] Progression has been slowed due to co-morbidities.   [] Plan just implemented, too soon to assess goals progression  [] Other:      ASSESSMENT:      Treatment/Activity Tolerance:  [x] Patient tolerated treatment well [] Patient limited by fatique  [] Patient limited by pain  [] Patient limited by other medical complications  [x] Other:     Prognosis: [x] Good [] Fair  [] Poor    Patient Requires Follow-up: [x] Yes  [] No    PLAN: Progress per MD protocol  [x] Continue per plan of care [] Alter current plan (see comments)  [] Plan of care initiated [] Hold pending MD visit [] Discharge    Electronically signed by:    London Garnica PT, DPT

## 2018-05-01 ENCOUNTER — HOSPITAL ENCOUNTER (OUTPATIENT)
Dept: OTHER | Age: 23
Discharge: HOME | End: 2018-05-31
Attending: ORTHOPAEDIC SURGERY | Admitting: ORTHOPAEDIC SURGERY

## 2018-10-03 ENCOUNTER — PROCEDURE VISIT (OUTPATIENT)
Dept: SPORTS MEDICINE | Age: 23
End: 2018-10-03

## 2018-10-03 DIAGNOSIS — S60.222A CONTUSION OF LEFT HAND, INITIAL ENCOUNTER: Primary | ICD-10-CM

## 2018-10-04 ENCOUNTER — OFFICE VISIT (OUTPATIENT)
Dept: ORTHOPEDIC SURGERY | Age: 23
End: 2018-10-04
Payer: COMMERCIAL

## 2018-10-04 VITALS
HEIGHT: 71 IN | HEART RATE: 75 BPM | DIASTOLIC BLOOD PRESSURE: 85 MMHG | BODY MASS INDEX: 38.36 KG/M2 | RESPIRATION RATE: 17 BRPM | SYSTOLIC BLOOD PRESSURE: 135 MMHG | WEIGHT: 274 LBS

## 2018-10-04 DIAGNOSIS — M79.642 LEFT HAND PAIN: Primary | ICD-10-CM

## 2018-10-04 PROCEDURE — 99214 OFFICE O/P EST MOD 30 MIN: CPT | Performed by: ORTHOPAEDIC SURGERY

## 2018-10-05 ASSESSMENT — PAIN SCALES - GENERAL: PAINLEVEL_OUTOF10: 5

## 2018-10-29 ENCOUNTER — PROCEDURE VISIT (OUTPATIENT)
Dept: SPORTS MEDICINE | Age: 23
End: 2018-10-29

## 2018-10-29 DIAGNOSIS — H92.02 ACUTE PAIN OF LEFT EAR: Primary | ICD-10-CM

## 2018-10-30 ENCOUNTER — OFFICE VISIT (OUTPATIENT)
Dept: FAMILY MEDICINE CLINIC | Age: 23
End: 2018-10-30
Payer: COMMERCIAL

## 2018-10-30 VITALS
RESPIRATION RATE: 20 BRPM | HEART RATE: 80 BPM | WEIGHT: 301 LBS | HEIGHT: 71 IN | TEMPERATURE: 98.4 F | BODY MASS INDEX: 42.14 KG/M2 | SYSTOLIC BLOOD PRESSURE: 120 MMHG | DIASTOLIC BLOOD PRESSURE: 78 MMHG

## 2018-10-30 DIAGNOSIS — S09.90XS: Primary | ICD-10-CM

## 2018-10-30 DIAGNOSIS — H91.92: Primary | ICD-10-CM

## 2018-10-30 PROCEDURE — 99204 OFFICE O/P NEW MOD 45 MIN: CPT | Performed by: REGISTERED NURSE

## 2018-10-30 ASSESSMENT — PATIENT HEALTH QUESTIONNAIRE - PHQ9
SUM OF ALL RESPONSES TO PHQ QUESTIONS 1-9: 0
SUM OF ALL RESPONSES TO PHQ QUESTIONS 1-9: 0
2. FEELING DOWN, DEPRESSED OR HOPELESS: 0
1. LITTLE INTEREST OR PLEASURE IN DOING THINGS: 0
SUM OF ALL RESPONSES TO PHQ9 QUESTIONS 1 & 2: 0

## 2018-11-02 ENCOUNTER — HOSPITAL ENCOUNTER (OUTPATIENT)
Dept: MRI IMAGING | Age: 23
Discharge: HOME | End: 2018-11-02
Payer: COMMERCIAL

## 2018-11-02 DIAGNOSIS — S09.90XS: ICD-10-CM

## 2018-11-02 DIAGNOSIS — H91.92: ICD-10-CM

## 2018-11-02 PROCEDURE — 70551 MRI BRAIN STEM W/O DYE: CPT

## 2018-11-02 ASSESSMENT — PAIN SCALES - GENERAL: PAINLEVEL_OUTOF10: 3

## 2018-11-20 ASSESSMENT — ENCOUNTER SYMPTOMS
SINUS PRESSURE: 0
WHEEZING: 0
SORE THROAT: 0
TROUBLE SWALLOWING: 0
VOICE CHANGE: 0
COUGH: 0
CHEST TIGHTNESS: 0
SHORTNESS OF BREATH: 0
FACIAL SWELLING: 0
RHINORRHEA: 0
SINUS PAIN: 0